# Patient Record
Sex: MALE | URBAN - METROPOLITAN AREA
[De-identification: names, ages, dates, MRNs, and addresses within clinical notes are randomized per-mention and may not be internally consistent; named-entity substitution may affect disease eponyms.]

---

## 2024-04-03 NOTE — DISCHARGE INSTRUCTIONS
reaction if you take certain diabetes medicines.  Follow-up care is a key part of your treatment and safety. Be sure to make and go to all appointments, and call your doctor if you are having problems. It's also a good idea to know your test results and keep a list of the medicines you take.  Where can you learn more?  Go to https://www.[employer]rock[employer].net/patientEd and enter I147 to learn more about \"Learning About Carbohydrate (Carb) Counting and Eating Out When You Have Diabetes.\"  Current as of: September 20, 2023               Content Version: 14.0  © 8157-4046 [employer].   Care instructions adapted under license by [employer]Â®. If you have questions about a medical condition or this instruction, always ask your healthcare professional. [employer] disclaims any warranty or liability for your use of this information.    General comments for diabetic / neuropathic ulcers:  *  Unless you've been instructed not to remove your dressings, be sure to inspect your feet daily, and notify us of any major changes.  *  If you do not have a long-term podiatrist, be sure to let us know.  *  Moisturize your skin regularly with Vaseline, Aquaphor, Aveeno, CeraVe, Cetaphil, Eucerin, Lubriderm, etc; but keep the skin between your toes dry.  *  Always allow your wound-care doctor or podiatrist to cut nails, calluses & corns.   *  Be sure to always wear footwear that fits well, and NEVER go without shoes and socks.  *  Do you know your Hemoglobin A1c level? You should! Please ask if you have questions.  *  Be sure to adhere to any recommendations from your PCP or endocrinologist when it comes to diabetes medications and diet. If you have questions, please ask.  *  If you smoke, your wound can not heal properly -- please talk with us when you're ready to quit.   *  Do not soak your feet unless specifically instructed to do so by us.     F/U Appointment is with Dr. Deng in 1 week, on

## 2024-04-04 ENCOUNTER — HOSPITAL ENCOUNTER (OUTPATIENT)
Dept: WOUND CARE | Age: 74
Discharge: HOME | End: 2024-04-04
Attending: PODIATRIST
Payer: MEDICARE

## 2024-04-04 VITALS
HEIGHT: 69 IN | WEIGHT: 234.2 LBS | DIASTOLIC BLOOD PRESSURE: 89 MMHG | TEMPERATURE: 97.5 F | SYSTOLIC BLOOD PRESSURE: 156 MMHG | HEART RATE: 73 BPM | RESPIRATION RATE: 18 BRPM | BODY MASS INDEX: 34.69 KG/M2

## 2024-04-04 DIAGNOSIS — E11.621 DIABETIC ULCER OF LEFT MIDFOOT ASSOCIATED WITH TYPE 2 DIABETES MELLITUS, WITH NECROSIS OF MUSCLE (HCC): ICD-10-CM

## 2024-04-04 DIAGNOSIS — L97.423 DIABETIC ULCER OF LEFT MIDFOOT ASSOCIATED WITH TYPE 2 DIABETES MELLITUS, WITH NECROSIS OF MUSCLE (HCC): ICD-10-CM

## 2024-04-04 DIAGNOSIS — L97.423 MIDFOOT ULCERATION, LEFT, WITH NECROSIS OF MUSCLE (HCC): Primary | ICD-10-CM

## 2024-04-04 PROCEDURE — 99212 OFFICE O/P EST SF 10 MIN: CPT

## 2024-04-04 PROCEDURE — 11043 DBRDMT MUSC&/FSCA 1ST 20/<: CPT

## 2024-04-04 RX ORDER — ASPIRIN 81 MG/1
81 TABLET, CHEWABLE ORAL DAILY
COMMUNITY

## 2024-04-04 RX ORDER — ATORVASTATIN CALCIUM 40 MG/1
1 TABLET, FILM COATED ORAL DAILY
COMMUNITY
Start: 2023-03-02

## 2024-04-04 RX ORDER — INSULIN DEGLUDEC 200 U/ML
60 INJECTION, SOLUTION SUBCUTANEOUS NIGHTLY
COMMUNITY

## 2024-04-04 RX ORDER — DOXYCYCLINE HYCLATE 20 MG
20 TABLET ORAL DAILY
COMMUNITY
Start: 2023-05-08

## 2024-04-04 RX ORDER — ERGOCALCIFEROL 1.25 MG/1
50000 CAPSULE ORAL WEEKLY
COMMUNITY

## 2024-04-04 RX ORDER — LIDOCAINE 40 MG/G
CREAM TOPICAL ONCE
Status: DISCONTINUED | OUTPATIENT
Start: 2024-04-04 | End: 2024-04-05 | Stop reason: HOSPADM

## 2024-04-04 RX ORDER — SEMAGLUTIDE 0.68 MG/ML
3 INJECTION, SOLUTION SUBCUTANEOUS WEEKLY
COMMUNITY

## 2024-04-04 RX ORDER — METOPROLOL SUCCINATE 50 MG/1
50 TABLET, EXTENDED RELEASE ORAL DAILY
COMMUNITY
Start: 2023-03-02

## 2024-04-04 RX ORDER — DAPAGLIFLOZIN AND METFORMIN HYDROCHLORIDE 5; 1000 MG/1; MG/1
5-1000 TABLET, FILM COATED, EXTENDED RELEASE ORAL DAILY
COMMUNITY
End: 2024-04-04

## 2024-04-04 RX ORDER — LOSARTAN POTASSIUM 50 MG/1
1 TABLET ORAL DAILY
COMMUNITY
Start: 2023-04-20

## 2024-04-04 RX ORDER — AMITRIPTYLINE HYDROCHLORIDE 10 MG/1
10 TABLET, FILM COATED ORAL NIGHTLY PRN
COMMUNITY

## 2024-04-04 NOTE — PLAN OF CARE
Pt to the [facility] for initial wound assessment. Left lateral foot noted. Wounds debrided today, pt tolerated well. MAINE reviewed, non compressible on the RLE. Last A1C 7.6. Bilateral monofilament 0/10. Pt not following diabetic diet, discussed in detail. Dr. Deng assessed pt's shoe, pt states he is on his feet often. Discussed offloading and new Darco with peg assist. Pt to continue with documented plan of care and to follow up in the [facility] in 1 week. Pt. aware to call sooner with any problems or questions/concerns. MD orders and D/C instructions reviewed, pt verbalized understanding.

## 2024-04-05 PROBLEM — L97.423 DIABETIC ULCER OF LEFT MIDFOOT ASSOCIATED WITH TYPE 2 DIABETES MELLITUS, WITH NECROSIS OF MUSCLE (HCC): Status: ACTIVE | Noted: 2024-04-05

## 2024-04-05 PROBLEM — E11.621 DIABETIC ULCER OF LEFT MIDFOOT ASSOCIATED WITH TYPE 2 DIABETES MELLITUS, WITH NECROSIS OF MUSCLE (HCC): Status: ACTIVE | Noted: 2024-04-05

## 2024-04-05 PROBLEM — L97.423 MIDFOOT ULCERATION, LEFT, WITH NECROSIS OF MUSCLE (HCC): Status: ACTIVE | Noted: 2024-04-05

## 2024-04-05 RX ORDER — LIDOCAINE 50 MG/G
OINTMENT TOPICAL ONCE
OUTPATIENT
Start: 2024-04-05 | End: 2024-04-05

## 2024-04-05 RX ORDER — CLOBETASOL PROPIONATE 0.5 MG/G
OINTMENT TOPICAL ONCE
OUTPATIENT
Start: 2024-04-05 | End: 2024-04-05

## 2024-04-05 RX ORDER — TRIAMCINOLONE ACETONIDE 1 MG/G
OINTMENT TOPICAL ONCE
OUTPATIENT
Start: 2024-04-05 | End: 2024-04-05

## 2024-04-05 RX ORDER — BETAMETHASONE DIPROPIONATE 0.5 MG/G
CREAM TOPICAL ONCE
OUTPATIENT
Start: 2024-04-05 | End: 2024-04-05

## 2024-04-05 RX ORDER — LIDOCAINE HYDROCHLORIDE 40 MG/ML
SOLUTION TOPICAL ONCE
OUTPATIENT
Start: 2024-04-05 | End: 2024-04-05

## 2024-04-05 RX ORDER — LIDOCAINE HYDROCHLORIDE 20 MG/ML
JELLY TOPICAL ONCE
OUTPATIENT
Start: 2024-04-05 | End: 2024-04-05

## 2024-04-05 RX ORDER — LIDOCAINE 40 MG/G
CREAM TOPICAL ONCE
OUTPATIENT
Start: 2024-04-05 | End: 2024-04-05

## 2024-04-05 RX ORDER — SODIUM CHLOR/HYPOCHLOROUS ACID 0.033 %
SOLUTION, IRRIGATION IRRIGATION ONCE
OUTPATIENT
Start: 2024-04-05 | End: 2024-04-05

## 2024-04-05 RX ORDER — BACITRACIN ZINC 500 [USP'U]/G
OINTMENT TOPICAL ONCE
OUTPATIENT
Start: 2024-04-05 | End: 2024-04-05

## 2024-04-05 RX ORDER — GENTAMICIN SULFATE 1 MG/G
OINTMENT TOPICAL ONCE
OUTPATIENT
Start: 2024-04-05 | End: 2024-04-05

## 2024-04-05 RX ORDER — IBUPROFEN 200 MG
TABLET ORAL ONCE
OUTPATIENT
Start: 2024-04-05 | End: 2024-04-05

## 2024-04-05 RX ORDER — BACITRACIN ZINC AND POLYMYXIN B SULFATE 500; 1000 [USP'U]/G; [USP'U]/G
OINTMENT TOPICAL ONCE
OUTPATIENT
Start: 2024-04-05 | End: 2024-04-05

## 2024-04-05 NOTE — PROGRESS NOTES
04/04/24 1038   Post-Procedure Volume (cm^3) 0.02 cm^3 04/04/24 1038   Distance Tunneling (cm) 0 cm 04/04/24 0955   Tunneling Position ___ O'Clock 0 04/04/24 0955   Undermining Starts ___ O'Clock 0 04/04/24 0955   Undermining Ends___ O'Clock 0 04/04/24 0955   Undermining Maxium Distance (cm) 0 04/04/24 0955   Wound Assessment Pink/red 04/04/24 0955   Drainage Amount Scant (moist but unmeasurable) 04/04/24 0955   Drainage Description Serous 04/04/24 0955   Odor None 04/04/24 0955   Amrita-wound Assessment Hyperkeratosis (callous) 04/04/24 0955   Number of days: 1           Total Surface Area Debrided:  100 sq cm     Percentage of wound debrided 100%    Bleeding:  Minimal    Hemostasis Achieved:  by pressure    Procedural Pain:  0  / 10     Post Procedural Pain:  0 / 10     Response to treatment:  Well tolerated by patient.           Plan:   Patient is evaluated  Wound sharply debrided without incident  Keep leg elevated when not active  Discussed appropriate diabetic diet in detail  Discussed appropriate shoes in detail  Follow-up    The nature of the patient's condition was explained in depth. The patient was informed that their compliance to the treatment plan is paramount to successful healing and prevention of further ulceration and/or infection     Discharge Treatment Wound 04/04/24 #1 left lateral plantar foot, diabetic zapata 1 (onset 4/22)-Dressing/Treatment: Other (comment) (Skin prep and felt applied per Dr. Deng-leave in place and keep clean, dry, intact  Betadine ointment  2x2  Tape)    Written Patient Discharge Instructions Given            Electronically signed by Seth Deng DPM on 4/5/2024 at 3:22 PM

## 2024-04-05 NOTE — DISCHARGE INSTRUCTIONS
is with Dr. Deng in 1 week, on                                   at                       .     Your nurse  is Letty LORENZO.      If we applied slip-resistant hospital socks today, be sure to remove them at least once a day to inspect your toes or feet, even if you're not changing the wraps or dressings underneath. If you see anything concerning (redness, excess moisture, etc), please call and let us know right away.     Should you experience any significant changes in your wound(s) (including redness, increased warmth, increased pain, increased drainage, odor, or fever) or have questions about your wound care, please contact the [facility] at [phone] Monday-Thursday from 8:00 am - 4:30 pm, or Friday from 8:00 am - 2:30 pm.  If you need help with your wound outside these hours and cannot wait until we are again available, contact your home-care company (if applicable), your PCP, or go to the nearest emergency room.

## 2024-04-11 ENCOUNTER — HOSPITAL ENCOUNTER (OUTPATIENT)
Dept: WOUND CARE | Age: 74
Discharge: HOME | End: 2024-04-11
Attending: PODIATRIST
Payer: MEDICARE

## 2024-04-11 VITALS
DIASTOLIC BLOOD PRESSURE: 92 MMHG | HEIGHT: 69 IN | SYSTOLIC BLOOD PRESSURE: 166 MMHG | TEMPERATURE: 97 F | HEART RATE: 85 BPM | WEIGHT: 233 LBS | BODY MASS INDEX: 34.51 KG/M2 | RESPIRATION RATE: 18 BRPM

## 2024-04-11 DIAGNOSIS — L97.423 DIABETIC ULCER OF LEFT MIDFOOT ASSOCIATED WITH TYPE 2 DIABETES MELLITUS, WITH NECROSIS OF MUSCLE (HCC): ICD-10-CM

## 2024-04-11 DIAGNOSIS — E11.621 DIABETIC ULCER OF LEFT MIDFOOT ASSOCIATED WITH TYPE 2 DIABETES MELLITUS, WITH NECROSIS OF MUSCLE (HCC): ICD-10-CM

## 2024-04-11 DIAGNOSIS — L97.423 MIDFOOT ULCERATION, LEFT, WITH NECROSIS OF MUSCLE (HCC): Primary | ICD-10-CM

## 2024-04-11 PROCEDURE — 11042 DBRDMT SUBQ TIS 1ST 20SQCM/<: CPT

## 2024-04-11 RX ORDER — TRIAMCINOLONE ACETONIDE 1 MG/G
OINTMENT TOPICAL ONCE
OUTPATIENT
Start: 2024-04-11 | End: 2024-04-11

## 2024-04-11 RX ORDER — LIDOCAINE HYDROCHLORIDE 40 MG/ML
SOLUTION TOPICAL ONCE
OUTPATIENT
Start: 2024-04-11 | End: 2024-04-11

## 2024-04-11 RX ORDER — LIDOCAINE 40 MG/G
CREAM TOPICAL ONCE
Status: DISCONTINUED | OUTPATIENT
Start: 2024-04-11 | End: 2024-04-12 | Stop reason: HOSPADM

## 2024-04-11 RX ORDER — LIDOCAINE HYDROCHLORIDE 40 MG/ML
SOLUTION TOPICAL ONCE
Status: DISCONTINUED | OUTPATIENT
Start: 2024-04-11 | End: 2024-04-12 | Stop reason: HOSPADM

## 2024-04-11 RX ORDER — LIDOCAINE HYDROCHLORIDE 20 MG/ML
JELLY TOPICAL ONCE
Status: DISCONTINUED | OUTPATIENT
Start: 2024-04-11 | End: 2024-04-12 | Stop reason: HOSPADM

## 2024-04-11 RX ORDER — BACITRACIN ZINC AND POLYMYXIN B SULFATE 500; 1000 [USP'U]/G; [USP'U]/G
OINTMENT TOPICAL ONCE
Status: DISCONTINUED | OUTPATIENT
Start: 2024-04-11 | End: 2024-04-12 | Stop reason: HOSPADM

## 2024-04-11 RX ORDER — LIDOCAINE HYDROCHLORIDE 20 MG/ML
JELLY TOPICAL ONCE
OUTPATIENT
Start: 2024-04-11 | End: 2024-04-11

## 2024-04-11 RX ORDER — BETAMETHASONE DIPROPIONATE 0.5 MG/G
CREAM TOPICAL ONCE
Status: DISCONTINUED | OUTPATIENT
Start: 2024-04-11 | End: 2024-04-12 | Stop reason: HOSPADM

## 2024-04-11 RX ORDER — TRIAMCINOLONE ACETONIDE 1 MG/G
OINTMENT TOPICAL ONCE
Status: DISCONTINUED | OUTPATIENT
Start: 2024-04-11 | End: 2024-04-12 | Stop reason: HOSPADM

## 2024-04-11 RX ORDER — LIDOCAINE 50 MG/G
OINTMENT TOPICAL ONCE
OUTPATIENT
Start: 2024-04-11 | End: 2024-04-11

## 2024-04-11 RX ORDER — CLOBETASOL PROPIONATE 0.5 MG/G
OINTMENT TOPICAL ONCE
OUTPATIENT
Start: 2024-04-11 | End: 2024-04-11

## 2024-04-11 RX ORDER — BETAMETHASONE DIPROPIONATE 0.5 MG/G
CREAM TOPICAL ONCE
OUTPATIENT
Start: 2024-04-11 | End: 2024-04-11

## 2024-04-11 RX ORDER — LIDOCAINE 40 MG/G
CREAM TOPICAL ONCE
OUTPATIENT
Start: 2024-04-11 | End: 2024-04-11

## 2024-04-11 RX ORDER — CLOBETASOL PROPIONATE 0.5 MG/G
OINTMENT TOPICAL ONCE
Status: DISCONTINUED | OUTPATIENT
Start: 2024-04-11 | End: 2024-04-12 | Stop reason: HOSPADM

## 2024-04-11 RX ORDER — BACITRACIN ZINC 500 [USP'U]/G
OINTMENT TOPICAL ONCE
OUTPATIENT
Start: 2024-04-11 | End: 2024-04-11

## 2024-04-11 RX ORDER — BACITRACIN ZINC 500 [USP'U]/G
OINTMENT TOPICAL ONCE
Status: DISCONTINUED | OUTPATIENT
Start: 2024-04-11 | End: 2024-04-12 | Stop reason: HOSPADM

## 2024-04-11 RX ORDER — IBUPROFEN 200 MG
TABLET ORAL ONCE
Status: DISCONTINUED | OUTPATIENT
Start: 2024-04-11 | End: 2024-04-12 | Stop reason: HOSPADM

## 2024-04-11 RX ORDER — BACITRACIN ZINC AND POLYMYXIN B SULFATE 500; 1000 [USP'U]/G; [USP'U]/G
OINTMENT TOPICAL ONCE
OUTPATIENT
Start: 2024-04-11 | End: 2024-04-11

## 2024-04-11 RX ORDER — LIDOCAINE 50 MG/G
OINTMENT TOPICAL ONCE
Status: DISCONTINUED | OUTPATIENT
Start: 2024-04-11 | End: 2024-04-12 | Stop reason: HOSPADM

## 2024-04-11 RX ORDER — GENTAMICIN SULFATE 1 MG/G
OINTMENT TOPICAL ONCE
OUTPATIENT
Start: 2024-04-11 | End: 2024-04-11

## 2024-04-11 RX ORDER — SODIUM CHLOR/HYPOCHLOROUS ACID 0.033 %
SOLUTION, IRRIGATION IRRIGATION ONCE
OUTPATIENT
Start: 2024-04-11 | End: 2024-04-11

## 2024-04-11 RX ORDER — IBUPROFEN 200 MG
TABLET ORAL ONCE
OUTPATIENT
Start: 2024-04-11 | End: 2024-04-11

## 2024-04-11 RX ORDER — GENTAMICIN SULFATE 1 MG/G
OINTMENT TOPICAL ONCE
Status: DISCONTINUED | OUTPATIENT
Start: 2024-04-11 | End: 2024-04-12 | Stop reason: HOSPADM

## 2024-04-11 RX ORDER — SODIUM CHLOR/HYPOCHLOROUS ACID 0.033 %
SOLUTION, IRRIGATION IRRIGATION ONCE
Status: DISCONTINUED | OUTPATIENT
Start: 2024-04-11 | End: 2024-04-12 | Stop reason: HOSPADM

## 2024-04-11 NOTE — PLAN OF CARE
Pt to the [facility] for wound assessment. Wounds stable. Wounds debrided today, pt tolerated well. Discussed diet and blood glucose control. Discussed proper fitting footwear. Pt to continue with documented plan of care and to follow up in the [facility] in 1 week. Pt. aware to call sooner with any problems or questions/concerns. MD orders and D/C instructions reviewed, pt verbalized understanding.

## 2024-04-12 NOTE — DISCHARGE INSTRUCTIONS
Wound Care Center Physician Orders and Discharge Instructions  [facility]  [address], [address]  [place] [address]  Telephone: [phone]      Fax: [phone]        Your home care company:   N/a .     Your wound-care supplies will be provided by:  self .     NAME:  Laxmi Hannon   YOB: 1950  PRIMARY DIAGNOSIS FOR WOUND CARE CENTER:  Diabetic Foot Ulcer .     Wound cleansing:   Do not scrub or use excessive force.  Wash hands with soap and water before and after dressing changes.  Prior to applying a clean dressing, cleanse wound with normal saline, wound cleanser, or mild soap and water. Ask your physician or nurse before getting the wound(s) wet in the shower.                Wound care for home:     Left Foot:  Skin prep and felt applied per Dr. Deng-leave in place and keep clean, dry, intact     Betadine ointment  2x2  Rebecca   Tape (no tape on skin)  Change daily     WEAR DARCO WITH PEG ASSIST SHOE AT ALL TIME WHEN WALKING!!     Please note, all wounds (unless stated otherwise here) were mechanically debrided at the time of cleansing here in the wound-care center today, so a small amount of pain, drainage or bleeding from that process might be expected, and is normal.      All products for home use, including multiple products for a single wound if applicable, are medically necessary in order to achieve the best chance at timely wound healing. See provider documentation for details if needed.     Substituted dressings applied in the [facility] today, if applicable:     N/a     New orders for this week (labs, imaging, medications, etc.):  4/18-1/4in self adhesive felt ([employer])  4/18-quick easy snacks...cheesesticks, lunch meats (no bread), handful of berries. [employer] chelsey (to track and enter foods and monitor sugar intake)  4/18-new shoes assessed and approved by Dr. Deng-bring back your new shoes next week (you can try to wear them around the house

## 2024-04-12 NOTE — PROGRESS NOTES
Absent , vibratory sensationAbsent  and protopathicAbsent   Patient missed 10/10 spots as tested with the [place] [employer] monofilament.  DTR’s Present bilateral Achilles. There were no reproducible neuritic symptoms on exam bilateral feet/ankles.  Derm: Ulceration to left foot.  Ecchymosis Absent  bilateral feet/foot.    Musculoskeletal: No pain with debridement of wound 5/5 muscle strength in/eversion and dorsi/plantarflexion bilateral feet.  No gross instability noted.         Assessment:     Problem List Items Addressed This Visit       Midfoot ulceration, left, with necrosis of muscle (HCC) - Primary    Diabetic ulcer of left midfoot associated with type 2 diabetes mellitus, with necrosis of muscle (HCC)       Procedure Note    Performed by: Seth Deng DPM    Consent obtained: Yes    Time out taken:  Yes    Pain Control: Anesthetic  Anesthetic: 4% Lidocaine Cream     Debridement:Excisional Debridement    Using curette the wound was sharply debrided    down through and including the removal of epidermis, dermis, and subcutaneous tissue.        Devitalized Tissue Debrided:  fibrin, slough, necrotic/eschar, exudate, and callus    Pre Debridement Measurements:  Are located in the Wound Documentation Flow Sheet    Wound #: 1     Wound Care Documentation:  Wound 04/04/24 #1 left lateral plantar foot, diabetic olivares 1 (onset 4/22) (Active)   Wound Image   04/04/24 0955   Wound Etiology Diabetic Olivares 1 04/11/24 0921   Dressing Status New dressing applied;Clean;Dry;Intact 04/11/24 1008   Wound Cleansed Soap and water 04/11/24 0921   Dressing/Treatment Other (comment) 04/11/24 1008   Offloading for Diabetic Foot Ulcers Offloading ordered;Felt or foam;Other (comment) 04/11/24 1008   Wound Length (cm) 0.2 cm 04/11/24 0921   Wound Width (cm) 0.2 cm 04/11/24 0921   Wound Depth (cm) 0.2 cm 04/11/24 0921   Wound Surface Area (cm^2) 0.04 cm^2 04/11/24 0921   Change in Wound Size % (l*w) 0 04/11/24 0921   Wound Volume

## 2024-04-18 ENCOUNTER — HOSPITAL ENCOUNTER (OUTPATIENT)
Dept: WOUND CARE | Age: 74
Discharge: HOME | End: 2024-04-18
Attending: PODIATRIST
Payer: MEDICARE

## 2024-04-18 VITALS
HEART RATE: 84 BPM | BODY MASS INDEX: 34.18 KG/M2 | SYSTOLIC BLOOD PRESSURE: 153 MMHG | WEIGHT: 230.8 LBS | DIASTOLIC BLOOD PRESSURE: 94 MMHG | HEIGHT: 69 IN | RESPIRATION RATE: 18 BRPM | TEMPERATURE: 98 F

## 2024-04-18 DIAGNOSIS — L97.423 MIDFOOT ULCERATION, LEFT, WITH NECROSIS OF MUSCLE (HCC): ICD-10-CM

## 2024-04-18 DIAGNOSIS — E11.621 DIABETIC ULCER OF LEFT MIDFOOT ASSOCIATED WITH TYPE 2 DIABETES MELLITUS, WITH NECROSIS OF MUSCLE (HCC): Primary | ICD-10-CM

## 2024-04-18 DIAGNOSIS — L97.423 DIABETIC ULCER OF LEFT MIDFOOT ASSOCIATED WITH TYPE 2 DIABETES MELLITUS, WITH NECROSIS OF MUSCLE (HCC): Primary | ICD-10-CM

## 2024-04-18 PROCEDURE — 11042 DBRDMT SUBQ TIS 1ST 20SQCM/<: CPT

## 2024-04-18 RX ORDER — BACITRACIN ZINC 500 [USP'U]/G
OINTMENT TOPICAL ONCE
Status: DISCONTINUED | OUTPATIENT
Start: 2024-04-18 | End: 2024-04-19 | Stop reason: HOSPADM

## 2024-04-18 RX ORDER — IBUPROFEN 200 MG
TABLET ORAL ONCE
Status: DISCONTINUED | OUTPATIENT
Start: 2024-04-18 | End: 2024-04-19 | Stop reason: HOSPADM

## 2024-04-18 RX ORDER — GENTAMICIN SULFATE 1 MG/G
OINTMENT TOPICAL ONCE
OUTPATIENT
Start: 2024-04-18 | End: 2024-04-18

## 2024-04-18 RX ORDER — BETAMETHASONE DIPROPIONATE 0.5 MG/G
CREAM TOPICAL ONCE
OUTPATIENT
Start: 2024-04-18 | End: 2024-04-18

## 2024-04-18 RX ORDER — LIDOCAINE HYDROCHLORIDE 20 MG/ML
JELLY TOPICAL ONCE
Status: DISCONTINUED | OUTPATIENT
Start: 2024-04-18 | End: 2024-04-19 | Stop reason: HOSPADM

## 2024-04-18 RX ORDER — LIDOCAINE HYDROCHLORIDE 20 MG/ML
JELLY TOPICAL ONCE
OUTPATIENT
Start: 2024-04-18 | End: 2024-04-18

## 2024-04-18 RX ORDER — TRIAMCINOLONE ACETONIDE 1 MG/G
OINTMENT TOPICAL ONCE
OUTPATIENT
Start: 2024-04-18 | End: 2024-04-18

## 2024-04-18 RX ORDER — BACITRACIN ZINC AND POLYMYXIN B SULFATE 500; 1000 [USP'U]/G; [USP'U]/G
OINTMENT TOPICAL ONCE
Status: DISCONTINUED | OUTPATIENT
Start: 2024-04-18 | End: 2024-04-19 | Stop reason: HOSPADM

## 2024-04-18 RX ORDER — LIDOCAINE 50 MG/G
OINTMENT TOPICAL ONCE
OUTPATIENT
Start: 2024-04-18 | End: 2024-04-18

## 2024-04-18 RX ORDER — LIDOCAINE 50 MG/G
OINTMENT TOPICAL ONCE
Status: DISCONTINUED | OUTPATIENT
Start: 2024-04-18 | End: 2024-04-19 | Stop reason: HOSPADM

## 2024-04-18 RX ORDER — CLOBETASOL PROPIONATE 0.5 MG/G
OINTMENT TOPICAL ONCE
Status: DISCONTINUED | OUTPATIENT
Start: 2024-04-18 | End: 2024-04-19 | Stop reason: HOSPADM

## 2024-04-18 RX ORDER — BACITRACIN ZINC 500 [USP'U]/G
OINTMENT TOPICAL ONCE
OUTPATIENT
Start: 2024-04-18 | End: 2024-04-18

## 2024-04-18 RX ORDER — LIDOCAINE 40 MG/G
CREAM TOPICAL ONCE
Status: DISCONTINUED | OUTPATIENT
Start: 2024-04-18 | End: 2024-04-19 | Stop reason: HOSPADM

## 2024-04-18 RX ORDER — SODIUM CHLOR/HYPOCHLOROUS ACID 0.033 %
SOLUTION, IRRIGATION IRRIGATION ONCE
Status: DISCONTINUED | OUTPATIENT
Start: 2024-04-18 | End: 2024-04-19 | Stop reason: HOSPADM

## 2024-04-18 RX ORDER — BETAMETHASONE DIPROPIONATE 0.5 MG/G
CREAM TOPICAL ONCE
Status: DISCONTINUED | OUTPATIENT
Start: 2024-04-18 | End: 2024-04-19 | Stop reason: HOSPADM

## 2024-04-18 RX ORDER — TRIAMCINOLONE ACETONIDE 1 MG/G
OINTMENT TOPICAL ONCE
Status: DISCONTINUED | OUTPATIENT
Start: 2024-04-18 | End: 2024-04-19 | Stop reason: HOSPADM

## 2024-04-18 RX ORDER — BACITRACIN ZINC AND POLYMYXIN B SULFATE 500; 1000 [USP'U]/G; [USP'U]/G
OINTMENT TOPICAL ONCE
OUTPATIENT
Start: 2024-04-18 | End: 2024-04-18

## 2024-04-18 RX ORDER — SODIUM CHLOR/HYPOCHLOROUS ACID 0.033 %
SOLUTION, IRRIGATION IRRIGATION ONCE
OUTPATIENT
Start: 2024-04-18 | End: 2024-04-18

## 2024-04-18 RX ORDER — LIDOCAINE HYDROCHLORIDE 40 MG/ML
SOLUTION TOPICAL ONCE
OUTPATIENT
Start: 2024-04-18 | End: 2024-04-18

## 2024-04-18 RX ORDER — CLOBETASOL PROPIONATE 0.5 MG/G
OINTMENT TOPICAL ONCE
OUTPATIENT
Start: 2024-04-18 | End: 2024-04-18

## 2024-04-18 RX ORDER — LIDOCAINE HYDROCHLORIDE 40 MG/ML
SOLUTION TOPICAL ONCE
Status: DISCONTINUED | OUTPATIENT
Start: 2024-04-18 | End: 2024-04-19 | Stop reason: HOSPADM

## 2024-04-18 RX ORDER — GENTAMICIN SULFATE 1 MG/G
OINTMENT TOPICAL ONCE
Status: DISCONTINUED | OUTPATIENT
Start: 2024-04-18 | End: 2024-04-19 | Stop reason: HOSPADM

## 2024-04-18 RX ORDER — IBUPROFEN 200 MG
TABLET ORAL ONCE
OUTPATIENT
Start: 2024-04-18 | End: 2024-04-18

## 2024-04-18 RX ORDER — LIDOCAINE 40 MG/G
CREAM TOPICAL ONCE
OUTPATIENT
Start: 2024-04-18 | End: 2024-04-18

## 2024-04-18 NOTE — PLAN OF CARE
Pt to the [facility] for wound assessment. Wounds stable. Wounds debrided today, pt tolerated well. Dr. Deng assessed pt's new shoes at bedside ([facility]) and approved. Pt to continue with documented plan of care and to follow up in the [facility] in 1 week. Pt. aware to call sooner with any problems or questions/concerns. MD orders and D/C instructions reviewed, pt verbalized understanding.

## 2024-04-19 NOTE — PROGRESS NOTES
[facility] Care Center  Progress Note and Procedure Note      Laxmi Hannon  AGE: 73 y.o.   GENDER: male  : 1950  TODAY'S DATE:  2024    Subjective:     Chief Complaint   Patient presents with    Wound Check         HISTORY of PRESENT ILLNESS HPI     Laxmi Hannon is a 73 y.o. male who presents today for wound evaluation.   History of Wound: Patient is having a long history of diabetic foot wound.  He has been treated by multiple doctors.  He has been referred here because his wound is not healing.  He admits to being an uncontrolled diabetic. And has not been participating in a diabetic diet.    He states that he is working more on his diabetic diet the past week and trying not to eat foods that increase his blood sugar.  However he does struggle with the diet.  He has no other complaints at this time.  He is changing the bandage as directed.  He denies current nausea, vomiting, fever, chills, shortness of breath or chest pain.  Wound Pain:  None  Severity:  0 / 10   Wound Type:  diabetic and pressure  Modifying Factors:  edema, diabetes, poor glucose control, chronic pressure, and shear force  Associated Signs/Symptoms:  edema and drainage        PAST MEDICAL HISTORY        Diagnosis Date    Arthritis     Cerebral artery occlusion with cerebral infarction (HCC)     Diabetes mellitus (HCC)     Hyperlipidemia     Hypertension     Retinopathy     right eye       PAST SURGICAL HISTORY    Past Surgical History:   Procedure Laterality Date    CATARACT REMOVAL WITH IMPLANT Bilateral     COLONOSCOPY      CORONARY ANGIOPLASTY WITH STENT PLACEMENT      CORONARY ARTERY BYPASS GRAFT         FAMILY HISTORY    Family History   Problem Relation Age of Onset    Colon Cancer Mother     Heart Disease Father     Diabetes Father        SOCIAL HISTORY    Social History     Tobacco Use    Smoking status: Never    Smokeless tobacco: Former     Types: Chew     Quit date:    Substance Use Topics    Drug use: Not  Reports 1 week of wobbly gait a/w generalized weakness in setting of recent COVID PNA and prolonged hospitalization.  - could be 2/2 deconditioning  - CBC, CMP unrevealing; evaluate for other organic causes with UA, BCx, TSH, RPR, CK, B1, B12, folate, clozapine level  - psych c/s  - neuro c/s  - fall precautions Reports 1 week of wobbly gait a/w generalized weakness in setting of recent COVID PNA and prolonged hospitalization.  - could be 2/2 deconditioning  - CBC, CMP unrevealing; evaluate for other organic causes with UA, BCx, TSH, RPR, CK, B1, B12, folate, clozapine level  - psych c/s placed  - neuro c/s  - fall precautions Reports 1 week of wobbly gait a/w generalized weakness in setting of recent COVID PNA and prolonged hospitalization.  - could be 2/2 deconditioning  - CBC, CMP unrevealing; evaluate for other organic causes with UA, urine tox, BCx, TSH, RPR, CK, B1, B12, folate, clozapine level  - psych c/s placed  - neuro c/s  - fall precautions Reports 1 week of wobbly gait a/w generalized weakness in setting of recent COVID PNA and prolonged hospitalization.  - could be 2/2 deconditioning vs possible hypoxemia on ambulation a/w recent viral illness and prolonged hospitalization  - CBC, CMP, CXR, ECG unrevealing; evaluate for other organic causes with UA, urine tox, BCx, TSH, RPR, CK, B1, B12, folate, clozapine level  - psych c/s placed  - neuro c/s placed  - eventual PT eval  - fall precautions Reports 1 week of wobbly gait a/w generalized weakness in setting of recent COVID PNA and prolonged hospitalization.  - could be 2/2 deconditioning vs possible hypoxemia on ambulation a/w recent viral illness and prolonged hospitalization  - CBC, CMP, CXR, ECG unrevealing; evaluate for other organic causes with UA, urine tox, BCx, TSH, RPR, CK, B1, B12, folate, clozapine level  - psych c/s placed  - neuro c/s placed  - PT eval  - fall precautions

## 2024-04-19 NOTE — DISCHARGE INSTRUCTIONS
Wound Care Center Physician Orders and Discharge Instructions  [facility]  [address], [address]  [place] [id number]  Telephone: [phone]      Fax: [phone]        Your home care company:   N/a .     Your wound-care supplies will be provided by:  self .     NAME:  Laxmi Hannon   YOB: 1950  PRIMARY DIAGNOSIS FOR WOUND CARE CENTER:  Diabetic Foot Ulcer .     Wound cleansing:   Do not scrub or use excessive force.  Wash hands with soap and water before and after dressing changes.  Prior to applying a clean dressing, cleanse wound with normal saline, wound cleanser, or mild soap and water. Ask your physician or nurse before getting the wound(s) wet in the shower.                Wound care for home:     Left Foot: HEALED  (Skin prep and felt applied per Dr. Deng)-leave in place and keep clean, dry, intact     Dry dressing (bandaid)  Change daily     WEAR DARCO WITH PEG ASSIST SHOE AT ALL TIME WHEN WALKING!!     Please note, all wounds (unless stated otherwise here) were mechanically debrided at the time of cleansing here in the wound-care center today, so a small amount of pain, drainage or bleeding from that process might be expected, and is normal.      All products for home use, including multiple products for a single wound if applicable, are medically necessary in order to achieve the best chance at timely wound healing. See provider documentation for details if needed.     Substituted dressings applied in the [facility] today, if applicable:     N/a     New orders for this week (labs, imaging, medications, etc.):  4/25-Please take a healed photo of LDA #1  4/25- Keep dressing dry-no shower due to felt    4/18-1/4in self adhesive felt ([employer])  4/18-quick easy snacks...cheesesticks, lunch meats (no bread), handful of berries. [employer] chelsey (to track and enter foods and monitor sugar intake)  4/18-new shoes assessed and approved by Dr. Deng-bring back

## 2024-04-25 ENCOUNTER — HOSPITAL ENCOUNTER (OUTPATIENT)
Dept: WOUND CARE | Age: 74
Discharge: HOME | End: 2024-04-25
Attending: PODIATRIST
Payer: MEDICARE

## 2024-04-25 VITALS
WEIGHT: 233 LBS | TEMPERATURE: 97.6 F | RESPIRATION RATE: 16 BRPM | SYSTOLIC BLOOD PRESSURE: 169 MMHG | HEART RATE: 83 BPM | HEIGHT: 69 IN | BODY MASS INDEX: 34.51 KG/M2 | DIASTOLIC BLOOD PRESSURE: 91 MMHG

## 2024-04-25 DIAGNOSIS — L97.423 DIABETIC ULCER OF LEFT MIDFOOT ASSOCIATED WITH TYPE 2 DIABETES MELLITUS, WITH NECROSIS OF MUSCLE (HCC): Primary | ICD-10-CM

## 2024-04-25 DIAGNOSIS — L97.423 MIDFOOT ULCERATION, LEFT, WITH NECROSIS OF MUSCLE (HCC): ICD-10-CM

## 2024-04-25 DIAGNOSIS — E11.621 DIABETIC ULCER OF LEFT MIDFOOT ASSOCIATED WITH TYPE 2 DIABETES MELLITUS, WITH NECROSIS OF MUSCLE (HCC): Primary | ICD-10-CM

## 2024-04-25 PROCEDURE — 99212 OFFICE O/P EST SF 10 MIN: CPT

## 2024-04-25 RX ORDER — SODIUM CHLOR/HYPOCHLOROUS ACID 0.033 %
SOLUTION, IRRIGATION IRRIGATION ONCE
Status: DISCONTINUED | OUTPATIENT
Start: 2024-04-25 | End: 2024-04-26 | Stop reason: HOSPADM

## 2024-04-25 RX ORDER — CLOBETASOL PROPIONATE 0.5 MG/G
OINTMENT TOPICAL ONCE
Status: DISCONTINUED | OUTPATIENT
Start: 2024-04-25 | End: 2024-04-26 | Stop reason: HOSPADM

## 2024-04-25 RX ORDER — LIDOCAINE 40 MG/G
CREAM TOPICAL ONCE
OUTPATIENT
Start: 2024-04-25 | End: 2024-04-25

## 2024-04-25 RX ORDER — IBUPROFEN 200 MG
TABLET ORAL ONCE
OUTPATIENT
Start: 2024-04-25 | End: 2024-04-25

## 2024-04-25 RX ORDER — LIDOCAINE HYDROCHLORIDE 40 MG/ML
SOLUTION TOPICAL ONCE
OUTPATIENT
Start: 2024-04-25 | End: 2024-04-25

## 2024-04-25 RX ORDER — SODIUM CHLOR/HYPOCHLOROUS ACID 0.033 %
SOLUTION, IRRIGATION IRRIGATION ONCE
OUTPATIENT
Start: 2024-04-25 | End: 2024-04-25

## 2024-04-25 RX ORDER — LIDOCAINE 50 MG/G
OINTMENT TOPICAL ONCE
Status: DISCONTINUED | OUTPATIENT
Start: 2024-04-25 | End: 2024-04-26 | Stop reason: HOSPADM

## 2024-04-25 RX ORDER — TRIAMCINOLONE ACETONIDE 1 MG/G
OINTMENT TOPICAL ONCE
Status: DISCONTINUED | OUTPATIENT
Start: 2024-04-25 | End: 2024-04-26 | Stop reason: HOSPADM

## 2024-04-25 RX ORDER — BETAMETHASONE DIPROPIONATE 0.5 MG/G
CREAM TOPICAL ONCE
Status: DISCONTINUED | OUTPATIENT
Start: 2024-04-25 | End: 2024-04-26 | Stop reason: HOSPADM

## 2024-04-25 RX ORDER — LIDOCAINE 40 MG/G
CREAM TOPICAL ONCE
Status: DISCONTINUED | OUTPATIENT
Start: 2024-04-25 | End: 2024-04-26 | Stop reason: HOSPADM

## 2024-04-25 RX ORDER — BACITRACIN ZINC AND POLYMYXIN B SULFATE 500; 1000 [USP'U]/G; [USP'U]/G
OINTMENT TOPICAL ONCE
Status: DISCONTINUED | OUTPATIENT
Start: 2024-04-25 | End: 2024-04-26 | Stop reason: HOSPADM

## 2024-04-25 RX ORDER — LIDOCAINE HYDROCHLORIDE 20 MG/ML
JELLY TOPICAL ONCE
OUTPATIENT
Start: 2024-04-25 | End: 2024-04-25

## 2024-04-25 RX ORDER — GENTAMICIN SULFATE 1 MG/G
OINTMENT TOPICAL ONCE
Status: DISCONTINUED | OUTPATIENT
Start: 2024-04-25 | End: 2024-04-26 | Stop reason: HOSPADM

## 2024-04-25 RX ORDER — LIDOCAINE HYDROCHLORIDE 20 MG/ML
JELLY TOPICAL ONCE
Status: DISCONTINUED | OUTPATIENT
Start: 2024-04-25 | End: 2024-04-26 | Stop reason: HOSPADM

## 2024-04-25 RX ORDER — LIDOCAINE 50 MG/G
OINTMENT TOPICAL ONCE
OUTPATIENT
Start: 2024-04-25 | End: 2024-04-25

## 2024-04-25 RX ORDER — CLOBETASOL PROPIONATE 0.5 MG/G
OINTMENT TOPICAL ONCE
OUTPATIENT
Start: 2024-04-25 | End: 2024-04-25

## 2024-04-25 RX ORDER — BETAMETHASONE DIPROPIONATE 0.5 MG/G
CREAM TOPICAL ONCE
OUTPATIENT
Start: 2024-04-25 | End: 2024-04-25

## 2024-04-25 RX ORDER — LIDOCAINE HYDROCHLORIDE 40 MG/ML
SOLUTION TOPICAL ONCE
Status: DISCONTINUED | OUTPATIENT
Start: 2024-04-25 | End: 2024-04-26 | Stop reason: HOSPADM

## 2024-04-25 RX ORDER — BACITRACIN ZINC AND POLYMYXIN B SULFATE 500; 1000 [USP'U]/G; [USP'U]/G
OINTMENT TOPICAL ONCE
OUTPATIENT
Start: 2024-04-25 | End: 2024-04-25

## 2024-04-25 RX ORDER — TRIAMCINOLONE ACETONIDE 1 MG/G
OINTMENT TOPICAL ONCE
OUTPATIENT
Start: 2024-04-25 | End: 2024-04-25

## 2024-04-25 RX ORDER — GENTAMICIN SULFATE 1 MG/G
OINTMENT TOPICAL ONCE
OUTPATIENT
Start: 2024-04-25 | End: 2024-04-25

## 2024-04-25 RX ORDER — BACITRACIN ZINC 500 [USP'U]/G
OINTMENT TOPICAL ONCE
Status: DISCONTINUED | OUTPATIENT
Start: 2024-04-25 | End: 2024-04-26 | Stop reason: HOSPADM

## 2024-04-25 RX ORDER — BACITRACIN ZINC 500 [USP'U]/G
OINTMENT TOPICAL ONCE
OUTPATIENT
Start: 2024-04-25 | End: 2024-04-25

## 2024-04-25 RX ORDER — IBUPROFEN 200 MG
TABLET ORAL ONCE
Status: DISCONTINUED | OUTPATIENT
Start: 2024-04-25 | End: 2024-04-26 | Stop reason: HOSPADM

## 2024-04-25 NOTE — PLAN OF CARE
Pt to the [facility] for wound assessment. Wound healed. Pt to continue with documented plan of care and to follow up in the [facility] in 2 weeks. Pt. aware to call sooner with any problems or questions/concerns. MD orders and D/C instructions reviewed, pt verbalized understanding.

## 2024-04-25 NOTE — PROGRESS NOTES
[facility] Wound Care Center  Progress Note and Procedure Note      Laxmi Hannon  AGE: 73 y.o.   GENDER: male  : 1950  TODAY'S DATE:  2024    Subjective:     Chief Complaint   Patient presents with    Wound Check         HISTORY of PRESENT ILLNESS HPI     Laxmi Hannon is a 73 y.o. male who presents today for wound evaluation.   History of Wound: Patient is having a long history of diabetic foot wound.  He has been treated by multiple doctors.  He has been referred here because his wound is not healing.    He is still working on his diabetic diet.  He denies any drainage in the past couple days.  He has left the felt on the foot.  He has no other complaints at this time.  He is changing the bandage as directed.  He denies current nausea, vomiting, fever, chills, shortness of breath or chest pain.  Wound Pain:  None  Severity:  0 / 10   Wound Type:  diabetic and pressure  Modifying Factors:  edema, diabetes, poor glucose control, chronic pressure, and shear force  Associated Signs/Symptoms:  edema and drainage        PAST MEDICAL HISTORY        Diagnosis Date    Arthritis     Cerebral artery occlusion with cerebral infarction (HCC)     Diabetes mellitus (HCC)     Hyperlipidemia     Hypertension     Retinopathy     right eye       PAST SURGICAL HISTORY    Past Surgical History:   Procedure Laterality Date    CATARACT REMOVAL WITH IMPLANT Bilateral     COLONOSCOPY      CORONARY ANGIOPLASTY WITH STENT PLACEMENT      CORONARY ARTERY BYPASS GRAFT         FAMILY HISTORY    Family History   Problem Relation Age of Onset    Colon Cancer Mother     Heart Disease Father     Diabetes Father        SOCIAL HISTORY    Social History     Tobacco Use    Smoking status: Never    Smokeless tobacco: Former     Types: Chew     Quit date:    Substance Use Topics    Drug use: Not Currently       ALLERGIES    Allergies   Allergen Reactions    Adhesive Tape Rash     Causes rash and skin tearing

## 2024-05-03 NOTE — DISCHARGE INSTRUCTIONS
Wound Care Center Physician Orders and Discharge Instructions  [facility]  [address], [address]  [place] [id number]  Telephone: [phone]      Fax: [phone]        Your home care company:   N/a .     Your wound-care supplies will be provided by:  self .     NAME:  Laxmi Hannon   YOB: 1950  PRIMARY DIAGNOSIS FOR WOUND CARE CENTER:  Diabetic Foot Ulcer .     Wound cleansing:   Do not scrub or use excessive force.  Wash hands with soap and water before and after dressing changes.  Prior to applying a clean dressing, cleanse wound with normal saline, wound cleanser, or mild soap and water. Ask your physician or nurse before getting the wound(s) wet in the shower.                Wound care for home:     Left Foot: HEALED  (Skin prep and felt applied to shoe insert per Dr. Deng)-leave in place and keep clean, dry, intact  Cloth bandaid  Change daily or every other day (assess if there is any drainage)    Please note, all wounds (unless stated otherwise here) were mechanically debrided at the time of cleansing here in the wound-care center today, so a small amount of pain, drainage or bleeding from that process might be expected, and is normal.      All products for home use, including multiple products for a single wound if applicable, are medically necessary in order to achieve the best chance at timely wound healing. See provider documentation for details if needed.     Substituted dressings applied in the [facility] today, if applicable:     N/a     New orders for this week (labs, imaging, medications, etc.):  4/18-1/4in self adhesive felt ([employer])  4/18-quick easy snacks...cheesesticks, lunch meats (no bread), handful of berries. [employer] chelsey (to track and enter foods and monitor sugar intake)  4/18-new shoes assessed and approved by Dr. Deng-bring back your new shoes next week (you can try to wear them around the house for a bit to make sure they

## 2024-05-09 ENCOUNTER — HOSPITAL ENCOUNTER (OUTPATIENT)
Dept: WOUND CARE | Age: 74
Discharge: HOME | End: 2024-05-09
Attending: PODIATRIST
Payer: MEDICARE

## 2024-05-09 VITALS
HEART RATE: 78 BPM | DIASTOLIC BLOOD PRESSURE: 93 MMHG | WEIGHT: 231.4 LBS | HEIGHT: 69 IN | BODY MASS INDEX: 34.27 KG/M2 | TEMPERATURE: 96.5 F | SYSTOLIC BLOOD PRESSURE: 154 MMHG | RESPIRATION RATE: 16 BRPM

## 2024-05-09 DIAGNOSIS — E11.621 DIABETIC ULCER OF LEFT MIDFOOT ASSOCIATED WITH TYPE 2 DIABETES MELLITUS, WITH NECROSIS OF MUSCLE (HCC): Primary | ICD-10-CM

## 2024-05-09 DIAGNOSIS — L97.423 MIDFOOT ULCERATION, LEFT, WITH NECROSIS OF MUSCLE (HCC): ICD-10-CM

## 2024-05-09 DIAGNOSIS — L97.423 DIABETIC ULCER OF LEFT MIDFOOT ASSOCIATED WITH TYPE 2 DIABETES MELLITUS, WITH NECROSIS OF MUSCLE (HCC): Primary | ICD-10-CM

## 2024-05-09 PROCEDURE — 99212 OFFICE O/P EST SF 10 MIN: CPT

## 2024-05-09 RX ORDER — LIDOCAINE HYDROCHLORIDE 40 MG/ML
SOLUTION TOPICAL ONCE
Status: DISCONTINUED | OUTPATIENT
Start: 2024-05-09 | End: 2024-05-10 | Stop reason: HOSPADM

## 2024-05-09 RX ORDER — CLOBETASOL PROPIONATE 0.5 MG/G
OINTMENT TOPICAL ONCE
OUTPATIENT
Start: 2024-05-09 | End: 2024-05-09

## 2024-05-09 RX ORDER — BACITRACIN ZINC AND POLYMYXIN B SULFATE 500; 1000 [USP'U]/G; [USP'U]/G
OINTMENT TOPICAL ONCE
OUTPATIENT
Start: 2024-05-09 | End: 2024-05-09

## 2024-05-09 RX ORDER — BETAMETHASONE DIPROPIONATE 0.5 MG/G
CREAM TOPICAL ONCE
Status: DISCONTINUED | OUTPATIENT
Start: 2024-05-09 | End: 2024-05-10 | Stop reason: HOSPADM

## 2024-05-09 RX ORDER — LIDOCAINE HYDROCHLORIDE 40 MG/ML
SOLUTION TOPICAL ONCE
OUTPATIENT
Start: 2024-05-09 | End: 2024-05-09

## 2024-05-09 RX ORDER — IBUPROFEN 200 MG
TABLET ORAL ONCE
OUTPATIENT
Start: 2024-05-09 | End: 2024-05-09

## 2024-05-09 RX ORDER — LIDOCAINE 50 MG/G
OINTMENT TOPICAL ONCE
Status: DISCONTINUED | OUTPATIENT
Start: 2024-05-09 | End: 2024-05-10 | Stop reason: HOSPADM

## 2024-05-09 RX ORDER — LIDOCAINE HYDROCHLORIDE 20 MG/ML
JELLY TOPICAL ONCE
OUTPATIENT
Start: 2024-05-09 | End: 2024-05-09

## 2024-05-09 RX ORDER — BACITRACIN ZINC 500 [USP'U]/G
OINTMENT TOPICAL ONCE
Status: DISCONTINUED | OUTPATIENT
Start: 2024-05-09 | End: 2024-05-10 | Stop reason: HOSPADM

## 2024-05-09 RX ORDER — TRIAMCINOLONE ACETONIDE 1 MG/G
OINTMENT TOPICAL ONCE
OUTPATIENT
Start: 2024-05-09 | End: 2024-05-09

## 2024-05-09 RX ORDER — SODIUM CHLOR/HYPOCHLOROUS ACID 0.033 %
SOLUTION, IRRIGATION IRRIGATION ONCE
OUTPATIENT
Start: 2024-05-09 | End: 2024-05-09

## 2024-05-09 RX ORDER — GENTAMICIN SULFATE 1 MG/G
OINTMENT TOPICAL ONCE
OUTPATIENT
Start: 2024-05-09 | End: 2024-05-09

## 2024-05-09 RX ORDER — BETAMETHASONE DIPROPIONATE 0.5 MG/G
CREAM TOPICAL ONCE
OUTPATIENT
Start: 2024-05-09 | End: 2024-05-09

## 2024-05-09 RX ORDER — LIDOCAINE 50 MG/G
OINTMENT TOPICAL ONCE
OUTPATIENT
Start: 2024-05-09 | End: 2024-05-09

## 2024-05-09 RX ORDER — SODIUM CHLOR/HYPOCHLOROUS ACID 0.033 %
SOLUTION, IRRIGATION IRRIGATION ONCE
Status: DISCONTINUED | OUTPATIENT
Start: 2024-05-09 | End: 2024-05-10 | Stop reason: HOSPADM

## 2024-05-09 RX ORDER — IBUPROFEN 200 MG
TABLET ORAL ONCE
Status: DISCONTINUED | OUTPATIENT
Start: 2024-05-09 | End: 2024-05-10 | Stop reason: HOSPADM

## 2024-05-09 RX ORDER — BACITRACIN ZINC AND POLYMYXIN B SULFATE 500; 1000 [USP'U]/G; [USP'U]/G
OINTMENT TOPICAL ONCE
Status: DISCONTINUED | OUTPATIENT
Start: 2024-05-09 | End: 2024-05-10 | Stop reason: HOSPADM

## 2024-05-09 RX ORDER — TRIAMCINOLONE ACETONIDE 1 MG/G
OINTMENT TOPICAL ONCE
Status: DISCONTINUED | OUTPATIENT
Start: 2024-05-09 | End: 2024-05-10 | Stop reason: HOSPADM

## 2024-05-09 RX ORDER — LIDOCAINE HYDROCHLORIDE 20 MG/ML
JELLY TOPICAL ONCE
Status: DISCONTINUED | OUTPATIENT
Start: 2024-05-09 | End: 2024-05-10 | Stop reason: HOSPADM

## 2024-05-09 RX ORDER — CLOBETASOL PROPIONATE 0.5 MG/G
OINTMENT TOPICAL ONCE
Status: DISCONTINUED | OUTPATIENT
Start: 2024-05-09 | End: 2024-05-10 | Stop reason: HOSPADM

## 2024-05-09 RX ORDER — LIDOCAINE 40 MG/G
CREAM TOPICAL ONCE
Status: DISCONTINUED | OUTPATIENT
Start: 2024-05-09 | End: 2024-05-10 | Stop reason: HOSPADM

## 2024-05-09 RX ORDER — BACITRACIN ZINC 500 [USP'U]/G
OINTMENT TOPICAL ONCE
OUTPATIENT
Start: 2024-05-09 | End: 2024-05-09

## 2024-05-09 RX ORDER — GENTAMICIN SULFATE 1 MG/G
OINTMENT TOPICAL ONCE
Status: DISCONTINUED | OUTPATIENT
Start: 2024-05-09 | End: 2024-05-10 | Stop reason: HOSPADM

## 2024-05-09 RX ORDER — LIDOCAINE 40 MG/G
CREAM TOPICAL ONCE
OUTPATIENT
Start: 2024-05-09 | End: 2024-05-09

## 2024-05-09 NOTE — PLAN OF CARE
Pt to the [facility] for wound assessment. Left foot wound remains healed. Discussed preventative care, pt agreeable. Dr. Deng applied felt to pt's shoe insert. Pt to continue with documented plan of care and to follow up in the [facility] as needed. Pt. aware to call sooner with any problems or questions/concerns. MD orders and D/C instructions reviewed, pt verbalized understanding.

## 2024-05-09 NOTE — PROGRESS NOTES
augmented betamethasone dipropionate (DIPROLENE-AF) 0.05 % cream    cadexomer iodine (IODOSORB) 0.9 % gel    clobetasol (TEMOVATE) 0.05 % ointment    collagenase ointment    gentamicin (GARAMYCIN) 0.1 % ointment    neomycin-bacitracin-polymyxin (NEOSPORIN) ointment    mupirocin (BACTROBAN) 2 % ointment    silver sulfADIAZINE (SILVADENE) 1 % cream    Sodium Hypochlorite (DAKINS) 0.125 % quarter strength external soln    Sodium Hypochlorite (DAKINS) 0.25 % half strength external soln    triamcinolone (KENALOG) 0.1 % ointment    silver nitrate applicators applicator 1 each    vashe wound therapy external solution    Other Relevant Orders    Initiate Outpatient Wound Care Protocol             Plan:   Patient is evaluated  Wound healed  Keep leg elevated when not active  Discussed appropriate diabetic diet in detail  Discussed appropriate shoes in detail, shoe offloaded with quarter inch self-adhesive felt  Follow-up with his primary podiatrist.  Wound remains healed to be discharged from wound care center.   The nature of the patient's condition was explained in depth. The patient was informed that their compliance to the treatment plan is paramount to successful healing and prevention of further ulceration and/or infection     Discharge Treatment      Written Patient Discharge Instructions Given            Electronically signed by Seth Deng DPM on 5/9/2024 at 10:34 AM

## 2024-05-09 NOTE — FLOWSHEET NOTE
(Skin prep and felt applied to shoe insert per Dr. Deng)-leave in place and keep clean, dry, intact  Cloth bandaid